# Patient Record
Sex: MALE | Race: WHITE | Employment: UNEMPLOYED | URBAN - METROPOLITAN AREA
[De-identification: names, ages, dates, MRNs, and addresses within clinical notes are randomized per-mention and may not be internally consistent; named-entity substitution may affect disease eponyms.]

---

## 2018-11-14 ENCOUNTER — HOSPITAL ENCOUNTER (OUTPATIENT)
Dept: LAB | Age: 58
Discharge: HOME | End: 2018-11-14

## 2018-11-14 PROCEDURE — 88305 TISSUE EXAM BY PATHOLOGIST: CPT

## 2020-10-13 NOTE — PROGRESS NOTES
Called to confirm appointment and arrival time for his procedure. Patient lives in a group home setting and will have a staff member present the entire time.

## 2020-10-14 ENCOUNTER — ANESTHESIA EVENT (OUTPATIENT)
Dept: ENDOSCOPY | Age: 60
End: 2020-10-14
Payer: MEDICARE

## 2020-10-14 ENCOUNTER — HOSPITAL ENCOUNTER (OUTPATIENT)
Age: 60
Setting detail: OUTPATIENT SURGERY
Discharge: HOME | End: 2020-10-14
Attending: INTERNAL MEDICINE | Admitting: INTERNAL MEDICINE
Payer: MEDICARE

## 2020-10-14 ENCOUNTER — ANESTHESIA (OUTPATIENT)
Dept: ENDOSCOPY | Age: 60
End: 2020-10-14
Payer: MEDICARE

## 2020-10-14 VITALS
TEMPERATURE: 97.6 F | BODY MASS INDEX: 34.44 KG/M2 | WEIGHT: 240 LBS | RESPIRATION RATE: 16 BRPM | SYSTOLIC BLOOD PRESSURE: 140 MMHG | DIASTOLIC BLOOD PRESSURE: 65 MMHG | OXYGEN SATURATION: 98 % | HEART RATE: 54 BPM

## 2020-10-14 LAB — GLUCOSE BLD STRIP.AUTO-MCNC: 149 MG/DL (ref 65–100)

## 2020-10-14 PROCEDURE — 74011000250 HC RX REV CODE- 250: Performed by: NURSE ANESTHETIST, CERTIFIED REGISTERED

## 2020-10-14 PROCEDURE — 76060000031 HC ANESTHESIA FIRST 0.5 HR: Performed by: INTERNAL MEDICINE

## 2020-10-14 PROCEDURE — 74011250636 HC RX REV CODE- 250/636: Performed by: INTERNAL MEDICINE

## 2020-10-14 PROCEDURE — 74011250636 HC RX REV CODE- 250/636: Performed by: NURSE ANESTHETIST, CERTIFIED REGISTERED

## 2020-10-14 PROCEDURE — 82962 GLUCOSE BLOOD TEST: CPT

## 2020-10-14 PROCEDURE — 76040000025: Performed by: INTERNAL MEDICINE

## 2020-10-14 PROCEDURE — 2709999900 HC NON-CHARGEABLE SUPPLY: Performed by: INTERNAL MEDICINE

## 2020-10-14 RX ORDER — PROPOFOL 10 MG/ML
INJECTION, EMULSION INTRAVENOUS
Status: DISCONTINUED | OUTPATIENT
Start: 2020-10-14 | End: 2020-10-14 | Stop reason: HOSPADM

## 2020-10-14 RX ORDER — LACTULOSE 10 G/15ML
10 SOLUTION ORAL; RECTAL 2 TIMES DAILY
COMMUNITY

## 2020-10-14 RX ORDER — SODIUM CHLORIDE, SODIUM LACTATE, POTASSIUM CHLORIDE, CALCIUM CHLORIDE 600; 310; 30; 20 MG/100ML; MG/100ML; MG/100ML; MG/100ML
1000 INJECTION, SOLUTION INTRAVENOUS CONTINUOUS
Status: DISCONTINUED | OUTPATIENT
Start: 2020-10-14 | End: 2020-10-14 | Stop reason: HOSPADM

## 2020-10-14 RX ORDER — PENICILLIN V POTASSIUM 500 MG/1
500 TABLET, FILM COATED ORAL 4 TIMES DAILY
COMMUNITY

## 2020-10-14 RX ORDER — INSULIN GLARGINE 100 [IU]/ML
33 INJECTION, SOLUTION SUBCUTANEOUS 2 TIMES DAILY
COMMUNITY

## 2020-10-14 RX ORDER — FUROSEMIDE 20 MG/1
20 TABLET ORAL DAILY
COMMUNITY

## 2020-10-14 RX ORDER — PROPOFOL 10 MG/ML
INJECTION, EMULSION INTRAVENOUS AS NEEDED
Status: DISCONTINUED | OUTPATIENT
Start: 2020-10-14 | End: 2020-10-14 | Stop reason: HOSPADM

## 2020-10-14 RX ORDER — INSULIN ASPART 100 [IU]/ML
16 INJECTION, SOLUTION INTRAVENOUS; SUBCUTANEOUS AS NEEDED
COMMUNITY

## 2020-10-14 RX ORDER — PRAVASTATIN SODIUM 20 MG/1
20 TABLET ORAL
COMMUNITY

## 2020-10-14 RX ORDER — LIDOCAINE HYDROCHLORIDE 20 MG/ML
INJECTION, SOLUTION EPIDURAL; INFILTRATION; INTRACAUDAL; PERINEURAL AS NEEDED
Status: DISCONTINUED | OUTPATIENT
Start: 2020-10-14 | End: 2020-10-14 | Stop reason: HOSPADM

## 2020-10-14 RX ORDER — NADOLOL 20 MG/1
20 TABLET ORAL DAILY
COMMUNITY

## 2020-10-14 RX ADMIN — LIDOCAINE HYDROCHLORIDE 25 MG: 20 INJECTION, SOLUTION EPIDURAL; INFILTRATION; INTRACAUDAL; PERINEURAL at 10:41

## 2020-10-14 RX ADMIN — PROPOFOL 200 MCG/KG/MIN: 10 INJECTION, EMULSION INTRAVENOUS at 10:41

## 2020-10-14 RX ADMIN — PROPOFOL 30 MG: 10 INJECTION, EMULSION INTRAVENOUS at 10:41

## 2020-10-14 RX ADMIN — SODIUM CHLORIDE, SODIUM LACTATE, POTASSIUM CHLORIDE, AND CALCIUM CHLORIDE: 600; 310; 30; 20 INJECTION, SOLUTION INTRAVENOUS at 10:38

## 2020-10-14 NOTE — ROUTINE PROCESS
VSS. Discharge instructions reviewed with patient and [place], caregiver and copy of instructions sent home with patient. Dr. Brendon Goldberg spoke with patient and caregiver prior to discharge. Questions answered. Discharged via wheelchair, wheeled out by [employer], [address] [place] [address]. IV discontinued prior to discharge. Personal items with patient at discharge: clothes, mask Dr. Brendon Goldberg gave caregiver instructions on morning medications

## 2020-10-14 NOTE — ANESTHESIA POSTPROCEDURE EVALUATION
Procedure(s):  ESOPHAGOGASTRODUODENOSCOPY (EGD)/ 26.    total IV anesthesia    Anesthesia Post Evaluation      Multimodal analgesia: multimodal analgesia used between 6 hours prior to anesthesia start to PACU discharge  Patient location during evaluation: PACU  Patient participation: complete - patient participated  Level of consciousness: awake and alert  Pain management: adequate  Airway patency: patent  Anesthetic complications: no  Cardiovascular status: acceptable  Respiratory status: acceptable  Hydration status: acceptable  Post anesthesia nausea and vomiting:  none  Final Post Anesthesia Temperature Assessment:  Normothermia (36.0-37.5 degrees C)      INITIAL Post-op Vital signs:   Vitals Value Taken Time   /73 10/14/2020 11:11 AM   Temp     Pulse 59 10/14/2020 11:13 AM   Resp 15 10/14/2020 10:52 AM   SpO2 100 % 10/14/2020 11:13 AM   Vitals shown include unvalidated device data.

## 2020-10-14 NOTE — ANESTHESIA PREPROCEDURE EVALUATION
Anesthetic History   No history of anesthetic complications            Review of Systems / Medical History  Patient summary reviewed and pertinent labs reviewed    Pulmonary            Asthma        Neuro/Psych              Cardiovascular    Hypertension: well controlled              Exercise tolerance: >4 METS  Comments: S/P AVR in 2014   GI/Hepatic/Renal     GERD: well controlled           Endo/Other    Diabetes: type 2, using insulin  Hypothyroidism: well controlled       Other Findings   Comments: Mental retardation         Physical Exam    Airway  Mallampati: II  TM Distance: > 6 cm    Mouth opening: Normal     Cardiovascular    Rhythm: regular           Dental  No notable dental hx       Pulmonary                 Abdominal  GI exam deferred       Other Findings            Anesthetic Plan    ASA: 3  Anesthesia type: total IV anesthesia          Induction: Intravenous  Anesthetic plan and risks discussed with: Patient and Legal guardian

## 2020-10-14 NOTE — OP NOTES
Esophagogastroduodenoscopy    DATE of PROCEDURE: 10/14/2020    INDICATION: Screen for varices    POSTPROCEDURE DIAGNOSIS:Portal Hypertensive Gastropathy  MEDICATIONS ADMINISTERED: MAC anesthesia (see anesthesia report)    INSTRUMENT: [id number]    PROCEDURE:  After obtaining informed consent, the patient was placed in the left lateral position and sedated. The endoscope was advanced under direct vision without difficulty. The esophagus, stomach (including retroflexed views) and duodenum were evaluated. The patient was taken to the recovery area in stable condition.     FINDINGS:  ESOPHAGUS: Normal, no Varices  STOMACH: Diffuse Portal Hypertensive Gastropathy  DUODENUM: Normal    Estimated blood loss: 0-minimal   Specimens obtained during procedure: none    PLAN: No medical changes, Repeat EGD 2 years

## 2020-10-14 NOTE — DISCHARGE INSTRUCTIONS
Gastrointestinal Esophagogastroduodenoscopy (EGD)/ Endoscopic Ultrasound(EUS)- Upper Exam Discharge Instructions    1. Call Dr. Aurora Kenney  for any problems or questions. 2. Contact the doctor's office for follow up appointment as directed. 3. Medication may cause drowsiness for several hours, therefore, do not drive or operate machinery for remainder of the day. 4. No alcohol today. 5. Do not make any important decisions such as signing legal paperwork. 6. Ordinarily, you may resume regular diet and activity after exam unless otherwise specified by your physician. 7. For mild soreness in your throat you may use Cepacol throat lozenges or warm  salt-water gargles as needed. Any additional instructions:   Repeat EGD in 2 years         Instructions given to [employer] and other family members.

## 2020-10-14 NOTE — H&P
[facility] H&P (Admission)        Date:  10/14/2020    Primary GI Physician:  tiffanie    Chief Complaint:  cirrhosis    Subjective:     History of Present Illness:  Patient is a [address] y.o. 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 PMH:  Past Medical History:   Diagnosis Date    CAD (coronary artery disease)     DM (diabetes mellitus) ([address]     managed with insulin    GERD (gastroesophageal reflux disease)     managed with meds    Poor historian        PSH:  Past Surgical History:   Procedure Laterality Date    HX AORTIC VALVE REPLACEMENT  10/20/2014    Dr. Deyanira Leiva    HX APPENDECTOMY      HX CHOLECYSTECTOMY      HX CORONARY ARTERY BYPASS GRAFT      HX ORTHOPAEDIC      left 2nd, 3rd, 4th partial amputation distal tips (injury)       Allergies: Allergies   Allergen Reactions    Codeine Swelling       Home Medications:  Prior to Admission medications    Medication Sig Start Date End Date Taking? Authorizing Provider   insulin glargine (LANTUS,BASAGLAR) 100 unit/mL (3 mL) inpn 33 Units by SubCUTAneous route two (2) times a day. Yes Provider, Historical   furosemide (LASIX) 20 mg tablet Take 20 mg by mouth daily. Yes Provider, Historical   lactulose (CHRONULAC) 10 gram/15 mL solution Take 10 g by mouth two (2) times a day. Yes Provider, Historical   nadoloL (CORGARD) 20 mg tablet Take 20 mg by mouth daily. Yes Provider, Historical   insulin aspart U-100 (NovoLOG Flexpen U-100 Insulin) 100 unit/mL (3 mL) inpn 16 Units by SubCUTAneous route as needed. Yes Provider, Historical   penicillin v potassium (VEETID) 500 mg tablet Take 500 mg by mouth four (4) times daily. Yes Provider, Historical   pravastatin (PravachoL) 20 mg tablet Take 20 mg by mouth nightly. Yes Provider, Historical   pantoprazole (Protonix) 40 mg granules for oral suspension 40 mg daily.    Yes Provider, Historical   ipratropium-albuterol (COMBIVENT) 18103 mcg/actuation inhaler Take 1 puff by inhalation four (4) times daily. Yes Provider, Historical   baclofen (LIORESAL) 10 mg tablet Take 10 mg by mouth three (3) times daily. For bladder   Yes Provider, Historical   levothyroxine (SYNTHROID) 25 mcg tablet Take 25 mcg by mouth Daily (before breakfast). Yes Provider, Historical   bimatoprost (LUMIGAN) 0.01 % ophthalmic drops Administer 1 drop to both eyes every evening. Yes Provider, Historical   levalbuterol (XOPENEX) 0.63 mg/3 mL nebulizer solution 3 mL by Nebulization route every six (6) hours as needed for Wheezing or Shortness of Breath. 10/27/14   Smiley Sandhu, NP   insulin lispro (HUMALOG) 100 unit/mL injection by SubCUTAneous route. 16 units with meals. Hold if <150 glucose    Provider, Historical   insulin detemir (LEVEMIR) 100 unit/mL injection by SubCUTAneous route nightly. Inject 33 units every morning  Indications: type 2 diabetes mellitus    Provider, Historical   metformin (GLUCOPHAGE) 500 mg tablet Take 500 mg by mouth two (2) times daily (with meals). Indications: TYPE 2 DIABETES MELLITUS    Provider, Historical   omega-3 acid ethyl esters (LOVAZA) 1 gram capsule Take 2 g by mouth two (2) times a day. Provider, Historical   olopatadine (PATANOL) 0.1 % ophthalmic solution Administer 1 drop to both eyes two (2) times a day. Provider, Historical   simvastatin (ZOCOR) 40 mg tablet Take 40 mg by mouth nightly. Provider, Historical   OTHER,NON-FORMULARY, Apply  to affected area. [employer] Antifungal 1% cream Apply on feet once daily at bedtime    Provider, Historical   acetaminophen (TYLENOL) 325 mg tablet Take 650 mg by mouth every four (4) hours as needed. For pain, fever, headache. Max. 8 tabs/day    Provider, Historical   OTHER,NON-FORMULARY, Emetrol one tablespoonful every 15 minutes for up to 4 doses. Provider, Historical   docusate sodium (DOC-Q-LACE) 100 mg capsule Take 100 mg by mouth daily as needed for Constipation. Provider, Historical   magnesium hydroxide (ALVARADO MILK OF MAGNESIA) 400 mg/5 mL suspension Take 30 mL by mouth daily as needed for Constipation. Provider, Historical   OTHER,NON-FORMULARY, Muscle rub cream apply 2 to 3 times daily as needed    Provider, Historical   Polyethylene Glycol 3350 powd Dissolve one capful in 8 oz. Liquid once daily for constipation as needed    Provider, Historical   OTHER,NON-FORMULARY, Safetussin DM cough  2 teaspoonsful by mouth every 4 hours as needed for cough    Provider, Historical   OTHER,NON-FORMULARY, Triple antibiotic ointment apply 2 times daily as needed for cuts and scratches    Provider, Historical       Hospital Medications:  Current Facility-Administered Medications   Medication Dose Route Frequency    lactated Ringers infusion 1,000 mL  1,000 mL IntraVENous CONTINUOUS       Social History:  Social History     Tobacco Use    Smoking status: Never Smoker    Smokeless tobacco: Current User    Tobacco comment: one can dip a day   Substance Use Topics    Alcohol use: No       Pt denies any history of drug use, tattoos, or blood transfusions. Family History:  Family History   Problem Relation Age of Onset    Diabetes Father        Review of Systems:  A detailed 10 system ROS is obtained, with pertinent positives as listed above. All others are negative. Objective:     Physical Exam:  Vitals:  Visit Vitals  /64   Pulse (!) 53   Temp 97.6 °F (36.4 °C)   Resp 18   Wt 108.9 kg (240 lb)   SpO2 99%   BMI 34.44 kg/m²     Gen:  Pt is alert, cooperative, no acute distress  Skin:  Extremities and face reveal no rashes. HEENT: Sclerae anicteric. Extra-occular muscles are intact. No oral ulcers. No abnormal pigmentation of the lips. The neck is supple. Cardiovascular: Regular rate and rhythm. No murmurs, gallops, or rubs. Respiratory:  Comfortable breathing with no accessory muscle use. Clear breath sounds with no wheezes, rales, or rhonchi.   GI:  Abdomen nondistended, soft, and nontender. Normal active bowel sounds. No enlargement of the liver or spleen. No masses palpable. Rectal:  Deferred  Musculoskeletal:  No pitting edema of the lower legs. Neurological:  Gross memory appears intact. Patient is alert and oriented. Psychiatric:  Mood appears appropriate with judgement intact. Lymphatic:  No cervical or supraclavicular adenopathy. Laboratory:    No results for input(s): WBC, HGB, HCT, PLT, MCV, NA, K, CL, CO2, BUN, CREA, CA, MG, GLU, AP, ALT, TBIL, TBILI, CBIL, ALB, TP, AML, LPSE, PTP, INR, APTT, HGBEXT, HCTEXT, PLTEXT, INREXT in the last 72 hours. No lab exists for component: SGOT, GPT, DBIL    Assessment:       Active Problems:    * No active hospital problems.  *      Plan:       Screen for varices

## 2023-02-02 RX ORDER — HYDROXYZINE HYDROCHLORIDE 25 MG/1
25 TABLET, FILM COATED ORAL 3 TIMES DAILY PRN
COMMUNITY

## 2023-02-02 RX ORDER — CHLORHEXIDINE GLUCONATE 0.12 MG/ML
15 RINSE ORAL 2 TIMES DAILY
COMMUNITY

## 2023-02-02 RX ORDER — ERGOCALCIFEROL 1.25 MG/1
50000 CAPSULE ORAL WEEKLY
COMMUNITY

## 2023-02-02 RX ORDER — BRIMONIDINE TARTRATE 2 MG/ML
1 SOLUTION/ DROPS OPHTHALMIC 2 TIMES DAILY
COMMUNITY

## 2023-02-02 RX ORDER — FUROSEMIDE 40 MG/1
40 TABLET ORAL EVERY MORNING
COMMUNITY

## 2023-02-02 RX ORDER — LATANOPROST 50 UG/ML
1 SOLUTION/ DROPS OPHTHALMIC NIGHTLY
COMMUNITY

## 2023-02-02 RX ORDER — FOLIC ACID 1 MG/1
1 TABLET ORAL DAILY
COMMUNITY

## 2023-02-02 RX ORDER — ATORVASTATIN CALCIUM 40 MG/1
40 TABLET, FILM COATED ORAL
COMMUNITY

## 2023-02-02 RX ORDER — SPIRONOLACTONE 25 MG/1
25 TABLET ORAL 2 TIMES DAILY
COMMUNITY

## 2023-02-02 NOTE — PERIOP NOTE
Pre-assessment completed with documents from [facility]. The following instructions were faxed to [id number] @ [facility] per request at [phone]. Please be aware that the procedure has been scheduled at the [facility] for [facility]. The address is [address] [place] [address]. If arrival time has not been received, call [phone]. Follow diet and prep instructions provided by the office, specifically those instructions regarding bowel prep, and/or the holding of food, drink, as well as gum, candy and mints prior to the procedure. If patient has NOT received any specific instructions from scheduling surgeon's office, you are advised to call surgeon's office to obtain instructions as soon as possible at [phone]     Bathe or shower the night before and the morning of procedure with non-moisturizing or antibacterial soap. NO lotions, oils, powders, or cologne on skin. NO make-up including eye make-up, and NO jewelry. Wear loose fitting comfortable, clean clothing. PATIENT TO TAKE ONLY THE FOLLOWING MEDICATION ON THE DAY OF THE PROCEDURE WITH SIPS OF WATER PER ANESTHESIOLOGIST: pantoprazole,nadolol, levothryoxine, Combivent inhaler, Basaglar 26 units. Also decrease Basaglar the evening prior to surgery to 26 units. / order per protocol Dr Babs Christie / Marcus Choe RN 2/2/23 @ 1400. MEDICATIONS TO HOLD:  vitamins and supplements 7 days prior, NSAIDS ( naproxen, ibuprofen) 5 days prior     Discharge instructions : medication given during procedure may cause drowsiness for several hours, therefore, patient must not drive or operate machinery for remainder of the day. Patient may not drink alcohol on the day of your procedure, and should resume regular diet and activity unless otherwise directed. Patient may experience abdominal distention for several hours that is relieved by the passage of gas.  Contact Dr Librado Gonzalez office if you have any of the following: fever or chills, severe abdominal pain or excessive amount of bleeding or a large amount when having a bowel movement.  Occasional specks of blood with bowel movement would not be unusual.

## 2023-02-02 NOTE — PROGRESS NOTES
Records received from [facility]. - [facility], scanned into Media if needed for reference.

## 2023-02-06 RX ORDER — HYDROMORPHONE HYDROCHLORIDE 1 MG/ML
0.5 INJECTION, SOLUTION INTRAMUSCULAR; INTRAVENOUS; SUBCUTANEOUS EVERY 5 MIN PRN
Status: CANCELLED | OUTPATIENT
Start: 2023-02-06

## 2023-02-06 RX ORDER — HYDROMORPHONE HYDROCHLORIDE 1 MG/ML
0.25 INJECTION, SOLUTION INTRAMUSCULAR; INTRAVENOUS; SUBCUTANEOUS EVERY 5 MIN PRN
Status: CANCELLED | OUTPATIENT
Start: 2023-02-06

## 2023-02-06 RX ORDER — DEXTROSE MONOHYDRATE 100 MG/ML
INJECTION, SOLUTION INTRAVENOUS CONTINUOUS PRN
Status: CANCELLED | OUTPATIENT
Start: 2023-02-06

## 2023-02-06 RX ORDER — LABETALOL HYDROCHLORIDE 5 MG/ML
10 INJECTION, SOLUTION INTRAVENOUS
Status: CANCELLED | OUTPATIENT
Start: 2023-02-06

## 2023-02-06 RX ORDER — ONDANSETRON 2 MG/ML
4 INJECTION INTRAMUSCULAR; INTRAVENOUS
Status: CANCELLED | OUTPATIENT
Start: 2023-02-06 | End: 2023-02-07

## 2023-02-06 RX ORDER — OXYCODONE HYDROCHLORIDE 5 MG/1
10 TABLET ORAL PRN
Status: CANCELLED | OUTPATIENT
Start: 2023-02-06 | End: 2023-02-06

## 2023-02-06 RX ORDER — IPRATROPIUM BROMIDE AND ALBUTEROL SULFATE 2.5; .5 MG/3ML; MG/3ML
1 SOLUTION RESPIRATORY (INHALATION)
Status: CANCELLED | OUTPATIENT
Start: 2023-02-06 | End: 2023-02-07

## 2023-02-06 RX ORDER — OXYCODONE HYDROCHLORIDE 5 MG/1
5 TABLET ORAL PRN
Status: CANCELLED | OUTPATIENT
Start: 2023-02-06 | End: 2023-02-06

## 2023-02-06 NOTE — PROGRESS NOTES
Spoke with patients caregiver Rayna Abimbola. Informed her of an arrival time of 0800 for his 0930 procedure.  policy given and she was informed to have him register downstairs prior to coming to the GI lab. She verbalized understanding of all instructions.

## 2023-02-07 ENCOUNTER — ANESTHESIA (OUTPATIENT)
Dept: ENDOSCOPY | Age: 63
End: 2023-02-07
Payer: MEDICARE

## 2023-02-07 ENCOUNTER — HOSPITAL ENCOUNTER (OUTPATIENT)
Age: 63
Setting detail: OUTPATIENT SURGERY
Discharge: HOME | End: 2023-02-07
Attending: INTERNAL MEDICINE | Admitting: INTERNAL MEDICINE
Payer: MEDICARE

## 2023-02-07 ENCOUNTER — ANESTHESIA EVENT (OUTPATIENT)
Dept: ENDOSCOPY | Age: 63
End: 2023-02-07
Payer: MEDICARE

## 2023-02-07 VITALS
DIASTOLIC BLOOD PRESSURE: 57 MMHG | BODY MASS INDEX: 29.02 KG/M2 | TEMPERATURE: 97.7 F | HEIGHT: 70 IN | OXYGEN SATURATION: 95 % | HEART RATE: 62 BPM | SYSTOLIC BLOOD PRESSURE: 117 MMHG | RESPIRATION RATE: 18 BRPM | WEIGHT: 202.7 LBS

## 2023-02-07 PROBLEM — I85.00 ESOPHAGEAL VARICES WITHOUT BLEEDING (HCC): Status: ACTIVE | Noted: 2018-11-28

## 2023-02-07 PROBLEM — K74.60 LIVER CIRRHOSIS SECONDARY TO NASH (NONALCOHOLIC STEATOHEPATITIS) (HCC): Status: ACTIVE | Noted: 2019-11-20

## 2023-02-07 PROBLEM — K76.6 PORTAL HYPERTENSION (HCC): Status: ACTIVE | Noted: 2020-01-14

## 2023-02-07 PROBLEM — K75.81 LIVER CIRRHOSIS SECONDARY TO NASH (NONALCOHOLIC STEATOHEPATITIS) (HCC): Status: ACTIVE | Noted: 2019-11-20

## 2023-02-07 LAB
GLUCOSE BLD STRIP.AUTO-MCNC: 68 MG/DL (ref 65–100)
GLUCOSE BLD STRIP.AUTO-MCNC: 91 MG/DL (ref 65–100)
SERVICE CMNT-IMP: NORMAL
SERVICE CMNT-IMP: NORMAL

## 2023-02-07 PROCEDURE — C1889 IMPLANT/INSERT DEVICE, NOC: HCPCS | Performed by: INTERNAL MEDICINE

## 2023-02-07 PROCEDURE — 3700000000 HC ANESTHESIA ATTENDED CARE: Performed by: INTERNAL MEDICINE

## 2023-02-07 PROCEDURE — 2580000003 HC RX 258: Performed by: ANESTHESIOLOGY

## 2023-02-07 PROCEDURE — 3700000001 HC ADD 15 MINUTES (ANESTHESIA): Performed by: INTERNAL MEDICINE

## 2023-02-07 PROCEDURE — 7100000010 HC PHASE II RECOVERY - FIRST 15 MIN: Performed by: INTERNAL MEDICINE

## 2023-02-07 PROCEDURE — 2709999900 HC NON-CHARGEABLE SUPPLY: Performed by: INTERNAL MEDICINE

## 2023-02-07 PROCEDURE — 6360000002 HC RX W HCPCS: Performed by: NURSE ANESTHETIST, CERTIFIED REGISTERED

## 2023-02-07 PROCEDURE — 88305 TISSUE EXAM BY PATHOLOGIST: CPT

## 2023-02-07 PROCEDURE — 7100000011 HC PHASE II RECOVERY - ADDTL 15 MIN: Performed by: INTERNAL MEDICINE

## 2023-02-07 PROCEDURE — 2500000003 HC RX 250 WO HCPCS: Performed by: NURSE ANESTHETIST, CERTIFIED REGISTERED

## 2023-02-07 PROCEDURE — 3609010700 HC COLONOSCOPY POLYPECTOMY REMOVAL SNARE/STOMA: Performed by: INTERNAL MEDICINE

## 2023-02-07 PROCEDURE — 82962 GLUCOSE BLOOD TEST: CPT

## 2023-02-07 PROCEDURE — 3609017100 HC EGD: Performed by: INTERNAL MEDICINE

## 2023-02-07 RX ORDER — SODIUM CHLORIDE 0.9 % (FLUSH) 0.9 %
5-40 SYRINGE (ML) INJECTION EVERY 12 HOURS SCHEDULED
Status: DISCONTINUED | OUTPATIENT
Start: 2023-02-07 | End: 2023-02-07 | Stop reason: HOSPADM

## 2023-02-07 RX ORDER — EPHEDRINE SULFATE/0.9% NACL/PF 50 MG/5 ML
SYRINGE (ML) INTRAVENOUS PRN
Status: DISCONTINUED | OUTPATIENT
Start: 2023-02-07 | End: 2023-02-07 | Stop reason: SDUPTHER

## 2023-02-07 RX ORDER — SODIUM CHLORIDE 0.9 % (FLUSH) 0.9 %
5-40 SYRINGE (ML) INJECTION PRN
Status: DISCONTINUED | OUTPATIENT
Start: 2023-02-07 | End: 2023-02-07 | Stop reason: HOSPADM

## 2023-02-07 RX ORDER — PROPOFOL 10 MG/ML
INJECTION, EMULSION INTRAVENOUS CONTINUOUS PRN
Status: DISCONTINUED | OUTPATIENT
Start: 2023-02-07 | End: 2023-02-07 | Stop reason: SDUPTHER

## 2023-02-07 RX ORDER — PHENYLEPHRINE HYDROCHLORIDE 10 MG/ML
INJECTION INTRAVENOUS PRN
Status: DISCONTINUED | OUTPATIENT
Start: 2023-02-07 | End: 2023-02-07 | Stop reason: SDUPTHER

## 2023-02-07 RX ORDER — LIDOCAINE HYDROCHLORIDE 20 MG/ML
INJECTION, SOLUTION EPIDURAL; INFILTRATION; INTRACAUDAL; PERINEURAL PRN
Status: DISCONTINUED | OUTPATIENT
Start: 2023-02-07 | End: 2023-02-07 | Stop reason: SDUPTHER

## 2023-02-07 RX ORDER — MIDAZOLAM HYDROCHLORIDE 2 MG/2ML
2 INJECTION, SOLUTION INTRAMUSCULAR; INTRAVENOUS
Status: DISCONTINUED | OUTPATIENT
Start: 2023-02-07 | End: 2023-02-07 | Stop reason: HOSPADM

## 2023-02-07 RX ORDER — SODIUM CHLORIDE 9 MG/ML
INJECTION, SOLUTION INTRAVENOUS PRN
Status: DISCONTINUED | OUTPATIENT
Start: 2023-02-07 | End: 2023-02-07 | Stop reason: HOSPADM

## 2023-02-07 RX ORDER — PROPOFOL 10 MG/ML
INJECTION, EMULSION INTRAVENOUS PRN
Status: DISCONTINUED | OUTPATIENT
Start: 2023-02-07 | End: 2023-02-07 | Stop reason: SDUPTHER

## 2023-02-07 RX ORDER — LIDOCAINE HYDROCHLORIDE 10 MG/ML
1 INJECTION, SOLUTION INFILTRATION; PERINEURAL
Status: DISCONTINUED | OUTPATIENT
Start: 2023-02-07 | End: 2023-02-07 | Stop reason: HOSPADM

## 2023-02-07 RX ORDER — SODIUM CHLORIDE, SODIUM LACTATE, POTASSIUM CHLORIDE, CALCIUM CHLORIDE 600; 310; 30; 20 MG/100ML; MG/100ML; MG/100ML; MG/100ML
INJECTION, SOLUTION INTRAVENOUS CONTINUOUS
Status: DISCONTINUED | OUTPATIENT
Start: 2023-02-07 | End: 2023-02-07 | Stop reason: HOSPADM

## 2023-02-07 RX ORDER — TRISODIUM CITRATE DIHYDRATE AND CITRIC ACID MONOHYDRATE 500; 334 MG/5ML; MG/5ML
30 SOLUTION ORAL
Status: DISCONTINUED | OUTPATIENT
Start: 2023-02-07 | End: 2023-02-07 | Stop reason: HOSPADM

## 2023-02-07 RX ORDER — ALBUTEROL SULFATE 2.5 MG/3ML
2.5 SOLUTION RESPIRATORY (INHALATION)
Status: DISCONTINUED | OUTPATIENT
Start: 2023-02-07 | End: 2023-02-07 | Stop reason: HOSPADM

## 2023-02-07 RX ADMIN — Medication 5 MG: at 10:02

## 2023-02-07 RX ADMIN — Medication 10 MG: at 09:51

## 2023-02-07 RX ADMIN — PROPOFOL 180 MCG/KG/MIN: 10 INJECTION, EMULSION INTRAVENOUS at 09:43

## 2023-02-07 RX ADMIN — SODIUM CHLORIDE, POTASSIUM CHLORIDE, SODIUM LACTATE AND CALCIUM CHLORIDE: 600; 310; 30; 20 INJECTION, SOLUTION INTRAVENOUS at 08:50

## 2023-02-07 RX ADMIN — PHENYLEPHRINE HYDROCHLORIDE 50 MCG: 10 INJECTION INTRAVENOUS at 10:00

## 2023-02-07 RX ADMIN — PHENYLEPHRINE HYDROCHLORIDE 50 MCG: 10 INJECTION INTRAVENOUS at 10:02

## 2023-02-07 RX ADMIN — LIDOCAINE HYDROCHLORIDE 40 MG: 20 INJECTION, SOLUTION EPIDURAL; INFILTRATION; INTRACAUDAL; PERINEURAL at 09:43

## 2023-02-07 RX ADMIN — PHENYLEPHRINE HYDROCHLORIDE 100 MCG: 10 INJECTION INTRAVENOUS at 10:08

## 2023-02-07 RX ADMIN — Medication 5 MG: at 10:00

## 2023-02-07 RX ADMIN — Medication 5 MG: at 10:08

## 2023-02-07 RX ADMIN — PROPOFOL 50 MG: 10 INJECTION, EMULSION INTRAVENOUS at 09:43

## 2023-02-07 ASSESSMENT — PAIN SCALES - GENERAL
PAINLEVEL_OUTOF10: 0

## 2023-02-07 ASSESSMENT — PAIN - FUNCTIONAL ASSESSMENT
PAIN_FUNCTIONAL_ASSESSMENT: ACTIVITIES ARE NOT PREVENTED
PAIN_FUNCTIONAL_ASSESSMENT: FACE, LEGS, ACTIVITY, CRY, AND CONSOLABILITY (FLACC)

## 2023-02-07 NOTE — ANESTHESIA PRE PROCEDURE
Department of Anesthesiology  Preprocedure Note       Name:  Ruby Grey   Age:  58 y.o.  :  1960                                          MRN:  [id number]         Date:  2023      Surgeon: Jazz Berger):  Leah Buitrago MD    Procedure: Procedure(s):  EGD ESOPHAGOGASTRODUODENOSCOPY  COLORECTAL CANCER SCREENING, NOT HIGH RISK/29    Medications prior to admission:   Prior to Admission medications    Medication Sig Start Date End Date Taking? Authorizing Provider   atorvastatin (LIPITOR) 40 MG tablet Take 40 mg by mouth nightly   Yes Historical Provider, MD   brimonidine (ALPHAGAN) 0.2 % ophthalmic solution Place 1 drop into both eyes in the morning and at bedtime   Yes Historical Provider, MD   chlorhexidine (PERIDEX) 0.12 % solution Swish and spit 15 mLs 2 times daily   Yes Historical Provider, MD   albuterol-ipratropium (COMBIVENT RESPIMAT)  MCG/ACT AERS inhaler Inhale 1 puff into the lungs in the morning and 1 puff at noon and 1 puff in the evening and 1 puff before bedtime.    Yes Historical Provider, MD   sodium chloride (OCEAN, BABY AYR) 0.65 % nasal spray 1 spray by Nasal route in the morning and at bedtime   Yes Historical Provider, MD   folic acid (FOLVITE) 1 MG tablet Take 1 mg by mouth daily   Yes Historical Provider, MD   furosemide (LASIX) 40 MG tablet Take 40 mg by mouth every morning   Yes Historical Provider, MD   spironolactone (ALDACTONE) 25 MG tablet Take 25 mg by mouth 2 times daily   Yes Historical Provider, MD   vitamin D (ERGOCALCIFEROL) 1.25 MG (41733 UT) CAPS capsule Take 50,000 Units by mouth once a week   Yes Historical Provider, MD   latanoprost (XALATAN) 0.005 % ophthalmic solution Place 1 drop into both eyes nightly   Yes Historical Provider, MD   hydrOXYzine HCl (ATARAX) 25 MG tablet Take 25 mg by mouth 3 times daily as needed for Itching   Yes Historical Provider, MD   Alum & Mag Hydroxide-Simeth (HEYDI-LANTA PO) Take 15 mLs by mouth every 8 hours as needed   Yes Historical Provider, MD   Ipratropium-Albuterol (COMBIVENT RESPIMAT IN) Inhale 1 puff into the lungs 4 times daily    Historical Provider, MD   Polyethylene Glycol 3350 POWD Bowel prep    Ar Automatic Reconciliation   acetaminophen (TYLENOL) 325 MG tablet Take 650 mg by mouth every 4 hours as needed    Ar Automatic Reconciliation   baclofen (LIORESAL) 10 MG tablet Take 10 mg by mouth 3 times daily    Ar Automatic Reconciliation   bimatoprost (LUMIGAN) 0.01 % SOLN ophthalmic drops Apply 1 drop to eye every evening    Ar Automatic Reconciliation   insulin aspart (NOVOLOG) 100 UNIT/ML injection pen Inject 14 Units into the skin 3 times daily (before meals) Hold for blood sugar<150    Ar Automatic Reconciliation   insulin glargine (LANTUS;BASAGLAR) 100 UNIT/ML injection pen Inject 32 Units into the skin 2 times daily    Ar Automatic Reconciliation   lactulose (CHRONULAC) 10 GM/15ML solution Take 10 g by mouth 2 times daily    Ar Automatic Reconciliation   levothyroxine (SYNTHROID) 25 MCG tablet Take 25 mcg by mouth every morning (before breakfast)    Ar Automatic Reconciliation   nadolol (CORGARD) 20 MG tablet Take 20 mg by mouth daily    Ar Automatic Reconciliation   olopatadine (PATANOL) 0.1 % ophthalmic solution Apply 1 drop to eye daily as needed    Ar Automatic Reconciliation   pantoprazole sodium (PROTONIX) 40 MG PACK packet Take 40 mg by mouth every morning (before breakfast)    Ar Automatic Reconciliation       Current medications:    Current Facility-Administered Medications   Medication Dose Route Frequency Provider Last Rate Last Admin    lidocaine 1 % injection 1 mL  1 mL IntraDERmal Once PRN Racquel Page MD        famotidine (PEPCID) 20 mg in sodium chloride (PF) 0.9 % 10 mL injection  20 mg IntraVENous Once PRN Racquel Page MD        citric acid-sodium citrate (BICITRA) solution 30 mL  30 mL Oral Once PRN Racquel Page MD        lactated ringers IV soln infusion   IntraVENous Continuous Lilia Murguia  mL/hr at 02/07/23 0850 New Bag at 02/07/23 0850    sodium chloride flush 0.9 % injection 5-40 mL  5-40 mL IntraVENous 2 times per day Lilia Murguia MD        sodium chloride flush 0.9 % injection 5-40 mL  5-40 mL IntraVENous PRN Lilia Murgiua MD        0.9 % sodium chloride infusion   IntraVENous PRN Lilia Murguia MD        midazolam PF (VERSED) injection 2 mg  2 mg IntraVENous Once PRN Lilia Murguia MD        albuterol (PROVENTIL) nebulizer solution 2.5 mg  2.5 mg Nebulization Q2H PRN Lilia Murguia MD           Allergies:     Allergies   Allergen Reactions    Codeine Swelling       Problem List:    Patient Active Problem List   Diagnosis Code    S/P AVR (aortic valve replacement) Z95.2    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 D69.59    Hypercholesterolemia E78.00    Hypertension I10    Ineffective airway clearance R06.89    Asthma J45.909    Diabetes ([facility] E11.9    Aortic stenosis I35.0    Hypothyroidism E03.9    GERD (gastroesophageal reflux disease) K21.9    Environmental allergies Z91.09    History of asthma Z87.09    Hypoxemia R09.02    Postoperative anemia due to acute blood loss D62       Past Medical History:        Diagnosis Date    Aortic valve replaced 2014    bioprosthetic (21mm MAGNA EASE AORTIC VALVE)    CAD (coronary artery disease)     Chronic airway obstruction (HCC)     Cirrhosis (HCC)     DM (diabetes mellitus) ([facility]     managed with insulin    Dyssynergia     Endocarditis of mitral valve     GERD (gastroesophageal reflux disease)     managed with meds    Glaucoma     High cholesterol     History of BPH     History of kidney stones     HTN (hypertension)     Intellectual disability     Osteoarthritis     Poor historian     Smoker     Thrombocytopenia ([facility]     Thyroid disease     Urinary retention with incomplete bladder emptying        Past Surgical History:        Procedure Laterality Date    AORTIC VALVE REPLACEMENT  10/20/2014    AORTIC VALVE REPLACEMENT (AVR) / bioprosthetic (21mm MAGNA EASE AORTIC VALVE)    APPENDECTOMY      CHOLECYSTECTOMY      CORONARY ARTERY BYPASS GRAFT      CYSTOSCOPY  09/2007    ORTHOPEDIC SURGERY      left 2nd, 3rd, 4th partial amputation distal tips (injury)       Social History:    Social History     Tobacco Use    Smoking status: Never    Smokeless tobacco: Current    Tobacco comments:     Quit smoking: one can dip a day   Substance Use Topics    Alcohol use: No                                Ready to quit: Not Answered  Counseling given: Not Answered  Tobacco comments: Quit smoking: one can dip a day      Vital Signs (Current):   Vitals:    02/02/23 1322 02/07/23 0847   BP:  (!) 172/70   Pulse:  59   Resp:  16   Temp:  98.1 °F (36.7 °C)   TempSrc:  Oral   SpO2:  99%   Weight: 202 lb 11.2 oz (91.9 kg)    Height: 5' 10\" (1.778 m) 5' 10\" (1.778 m)                                              BP Readings from Last 3 Encounters:   02/07/23 (!) 172/70       NPO Status: Time of last liquid consumption: 0600                        Time of last solid consumption: 2100                        Date of last liquid consumption: 02/07/23                        Date of last solid food consumption: 02/05/23    BMI:   Wt Readings from Last 3 Encounters:   02/02/23 202 lb 11.2 oz (91.9 kg)     Body mass index is 29.08 kg/m².     CBC: No results found for: WBC, RBC, HGB, HCT, MCV, RDW, PLT    CMP: No results found for: NA, K, CL, CO2, BUN, CREATININE, GFRAA, AGRATIO, LABGLOM, GLUCOSE, GLU, PROT, CALCIUM, BILITOT, ALKPHOS, AST, ALT    POC Tests:   Recent Labs     02/07/23  0839   POCGLU 68       Coags: No results found for: PROTIME, INR, APTT    HCG (If Applicable): No results found for: PREGTESTUR, PREGSERUM, HCG, HCGQUANT     ABGs: No results found for: PHART, PO2ART, GJL4XGN, AVE8YGK, BEART, Q9RQHRTY     Type & Screen (If Applicable):  No results found for: LABABO, [address]    Drug/Infectious Status (If Applicable):  No results found for: HIV, HEPCAB    COVID-19 Screening (If Applicable): No results found for: COVID19        Anesthesia Evaluation  Patient summary reviewed and Nursing notes reviewed  Airway: Mallampati: I  TM distance: <3 FB   Neck ROM: full  Mouth opening: > = 3 FB   Dental:    (+) poor dentition      Pulmonary: breath sounds clear to auscultation  (+) COPD:  asthma:                            Cardiovascular:    (+) hypertension:, valvular problems/murmurs ( Aortic valve replaced bioprosthetic ):, CAD:, CABG/stent (CABG):, murmur: Grade 2, Aortic,         Rhythm: regular  Rate: normal                    Neuro/Psych:   (+) neuromuscular disease (Dyssynergia):,             GI/Hepatic/Renal:   (+) GERD:, liver disease (LUNA cirrhosis ): esophageal varices,           Endo/Other:    (+) Diabetes (BS 91)Type II DM, , hypothyroidism, blood dyscrasia: thrombocytopenia:., .                 Abdominal:             Vascular: Other Findings:           Anesthesia Plan      TIVA     ASA 4     (Patient is developmentally delayed. Lives at a group home. Caregiver consented for anesthesia    Dr. Freda De La eVga aware of thrombocytopenia (40k in Nov 2022). He plans to assess for varices and not biopsy today. If any lesion is noted, he plans to bring the patient back for intervention/biopsy later)  Induction: intravenous. Anesthetic plan and risks discussed with patient Jessica Balderrama  - caregiver). Plan discussed with CRNA.                     Annamarie Rizzo MD   2/7/2023

## 2023-02-07 NOTE — OP NOTE
COLONOSCOPY    DATE of PROCEDURE: 2/7/2023    INDICATION: Hx colon polyp    POSTPROCEDURE DIAGNOSIS: Colon Polyps    MEDICATION:   MAC anesthesia (see anesthesia report)    INSTRUMENT: [id number]    PROCEDURE: After obtaining informed consent, the patient was placed in the left lateral position and sedated. The endoscope was advanced to the cecum where the appendiceal orifice and ileocecal valve were identified. On withdrawal, the colon was carefully visualized in a 360 degree fashion, looking between folds and proximal and distal aspect of the folds. The patient was taken to the recovery area in stable condition.     FINDINGS:  Rectum: Rectal varices  Sigmoid: 1cm polyp snared base clipped  Descending Colon: normal  Transverse Colon: normal  Ascending Colon: (3) 4-7mm polyps removed by snare and cold Bx, base of largest clipped  Cecum: normal  Terminal ileum: not entered    Bowel Prep: Adequate  Estimated blood loss: 5cc  Specimens obtained during procedure: polyps    ASSESSMENT/PLAN: Follow path

## 2023-02-07 NOTE — H&P
[facility] H&P (Admission)        Date:  2/7/2023    Primary GI Physician:  Karli    Chief Complaint:  screening    Subjective:     History of Present Illness:  Patient is a 58 y.o. male with PMH ncluding but not limited to cirrhosis, developmental delay, who is being admitted for elective endoscopy. PMH:  Past Medical History:   Diagnosis Date    Aortic valve replaced 2014    bioprosthetic (21mm MAGNA EASE AORTIC VALVE)    CAD (coronary artery disease)     Chronic airway obstruction (HCC)     Cirrhosis (HCC)     DM (diabetes mellitus) ([address]     managed with insulin    Dyssynergia     Endocarditis of mitral valve     GERD (gastroesophageal reflux disease)     managed with meds    Glaucoma     High cholesterol     History of BPH     History of kidney stones     HTN (hypertension)     Intellectual disability     Osteoarthritis     Poor historian     Smoker     Thrombocytopenia (HCC)     Thyroid disease     Urinary retention with incomplete bladder emptying        PSH:  Past Surgical History:   Procedure Laterality Date    AORTIC VALVE REPLACEMENT  10/20/2014    AORTIC VALVE REPLACEMENT (AVR) / bioprosthetic (21mm MAGNA EASE AORTIC VALVE)    APPENDECTOMY      CHOLECYSTECTOMY      CORONARY ARTERY BYPASS GRAFT      CYSTOSCOPY  09/2007    ORTHOPEDIC SURGERY      left 2nd, 3rd, 4th partial amputation distal tips (injury)       Allergies: Allergies   Allergen Reactions    Codeine Swelling       Home Medications:  Prior to Admission medications    Medication Sig Start Date End Date Taking?  Authorizing Provider   atorvastatin (LIPITOR) 40 MG tablet Take 40 mg by mouth nightly   Yes Historical Provider, MD   brimonidine (ALPHAGAN) 0.2 % ophthalmic solution Place 1 drop into both eyes in the morning and at bedtime   Yes Historical Provider, MD   chlorhexidine (PERIDEX) 0.12 % solution Swish and spit 15 mLs 2 times daily   Yes Historical Provider, MD   albuterol-ipratropium (COMBIVENT RESPIMAT)  MCG/ACT AERS inhaler Inhale 1 puff into the lungs in the morning and 1 puff at noon and 1 puff in the evening and 1 puff before bedtime.    Yes Historical Provider, MD   sodium chloride (OCEAN, BABY AYR) 0.65 % nasal spray 1 spray by Nasal route in the morning and at bedtime   Yes Historical Provider, MD   folic acid (FOLVITE) 1 MG tablet Take 1 mg by mouth daily   Yes Historical Provider, MD   furosemide (LASIX) 40 MG tablet Take 40 mg by mouth every morning   Yes Historical Provider, MD   spironolactone (ALDACTONE) 25 MG tablet Take 25 mg by mouth 2 times daily   Yes Historical Provider, MD   vitamin D (ERGOCALCIFEROL) 1.25 MG (28158 UT) CAPS capsule Take 50,000 Units by mouth once a week   Yes Historical Provider, MD   latanoprost (XALATAN) 0.005 % ophthalmic solution Place 1 drop into both eyes nightly   Yes Historical Provider, MD   hydrOXYzine HCl (ATARAX) 25 MG tablet Take 25 mg by mouth 3 times daily as needed for Itching   Yes Historical Provider, MD   Alum & Mag Hydroxide-Simeth (HEYDI-LANTA PO) Take 15 mLs by mouth every 8 hours as needed   Yes Historical Provider, MD   Ipratropium-Albuterol (COMBIVENT RESPIMAT IN) Inhale 1 puff into the lungs 4 times daily    Historical Provider, MD   Polyethylene Glycol 3350 POWD Bowel prep    Ar Automatic Reconciliation   acetaminophen (TYLENOL) 325 MG tablet Take 650 mg by mouth every 4 hours as needed    Ar Automatic Reconciliation   baclofen (LIORESAL) 10 MG tablet Take 10 mg by mouth 3 times daily    Ar Automatic Reconciliation   bimatoprost (LUMIGAN) 0.01 % SOLN ophthalmic drops Apply 1 drop to eye every evening    Ar Automatic Reconciliation   insulin aspart (NOVOLOG) 100 UNIT/ML injection pen Inject 14 Units into the skin 3 times daily (before meals) Hold for blood sugar<150    Ar Automatic Reconciliation   insulin glargine (LANTUS;BASAGLAR) 100 UNIT/ML injection pen Inject 32 Units into the skin 2 times daily    Ar Automatic Reconciliation   lactulose (CHRONULAC) 10 GM/15ML solution Take 10 g by mouth 2 times daily    Ar Automatic Reconciliation   levothyroxine (SYNTHROID) 25 MCG tablet Take 25 mcg by mouth every morning (before breakfast)    Ar Automatic Reconciliation   nadolol (CORGARD) 20 MG tablet Take 20 mg by mouth daily    Ar Automatic Reconciliation   olopatadine (PATANOL) 0.1 % ophthalmic solution Apply 1 drop to eye daily as needed    Ar Automatic Reconciliation   pantoprazole sodium (PROTONIX) 40 MG PACK packet Take 40 mg by mouth every morning (before breakfast)    Ar Automatic Reconciliation       Hospital Medications:  Current Facility-Administered Medications   Medication Dose Route Frequency    lidocaine 1 % injection 1 mL  1 mL IntraDERmal Once PRN    famotidine (PEPCID) 20 mg in sodium chloride (PF) 0.9 % 10 mL injection  20 mg IntraVENous Once PRN    citric acid-sodium citrate (BICITRA) solution 30 mL  30 mL Oral Once PRN    lactated ringers IV soln infusion   IntraVENous Continuous    sodium chloride flush 0.9 % injection 5-40 mL  5-40 mL IntraVENous 2 times per day    sodium chloride flush 0.9 % injection 5-40 mL  5-40 mL IntraVENous PRN    0.9 % sodium chloride infusion   IntraVENous PRN    midazolam PF (VERSED) injection 2 mg  2 mg IntraVENous Once PRN    albuterol (PROVENTIL) nebulizer solution 2.5 mg  2.5 mg Nebulization Q2H PRN       Social History:  Social History     Tobacco Use    Smoking status: Never    Smokeless tobacco: Current    Tobacco comments:     Quit smoking: one can dip a day   Substance Use Topics    Alcohol use: No       Pt denies any history of drug use, tattoos, or blood transfusions. Family History:  Family History   Problem Relation Age of Onset    Diabetes Father        Review of Systems:  A detailed 10 system ROS is obtained, with pertinent positives as listed above. All others are negative.     Objective:     Physical Exam:  Vitals:  BP (!) 172/70   Pulse 59   Temp 98.1 °F (36.7 °C) (Oral)   Resp 16   Ht 5' 10\" (1.778 m)   Wt 202 lb 11.2 oz (91.9 kg)   SpO2 99%   BMI 29.08 kg/m²   Gen:  Pt is alert, cooperative, no acute distress  Skin:  Extremities and face reveal no rashes. HEENT: Sclerae anicteric. Extra-occular muscles are intact. No oral ulcers. No abnormal pigmentation of the lips. The neck is supple. Cardiovascular: Regular rate and rhythm. No murmurs, gallops, or rubs. Respiratory:  Comfortable breathing with no accessory muscle use. Clear breath sounds with no wheezes, rales, or rhonchi. GI:  Abdomen nondistended, soft, and nontender. Normal active bowel sounds. No enlargement of the liver or spleen. No masses palpable. Rectal:  Deferred  Musculoskeletal:  No pitting edema of the lower legs. Neurological:  Gross memory appears intact. Patient is alert and oriented. Psychiatric:  Mood appears appropriate with judgement intact. Lymphatic:  No cervical or supraclavicular adenopathy. Laboratory:    No results for input(s): WBC, HGB, HCT, PLT, MCV, NA, K, CL, CO2, BUN, CREA, CA, MG, GLU, AST, ALT, ALB, TP, AML, INR, APTT in the last 72 hours. Invalid input(s): AP, GPT, TBIL, TBILI, DBIL, CBIL, LPSE, PTP    Assessment:       Active Problems:    * No active hospital problems. *  Resolved Problems:    * No resolved hospital problems.  *      Plan:        Eval Esophageal varices  Hx Colon polyps-  plan colonoscopy

## 2023-02-07 NOTE — OP NOTE
Esophagogastroduodenoscopy    DATE of PROCEDURE: 2/7/2023    INDICATION: Eval varices    POSTPROCEDURE DIAGNOSIS: esoph varices, PHG    MEDICATIONS ADMINISTERED: MAC anesthesia (see anesthesia report)    INSTRUMENT:[id number]    PROCEDURE:  After obtaining informed consent, the patient was placed in the left lateral position and sedated. The endoscope was advanced under direct vision without difficulty. The esophagus, stomach (including retroflexed views) and duodenum were evaluated. The patient was taken to the recovery area in stable condition.     FINDINGS:  ESOPHAGUS: Grade 2 varices without red color signs  STOMACH:Diffuse Portal Hypoertensive Gastritis  DUODENUM: Normal    Estimated blood loss: 0-minimal   Specimens obtained during procedure: none    PLAN: Schedule office visit to discuss TIPs

## 2023-02-07 NOTE — ANESTHESIA POSTPROCEDURE EVALUATION
Department of Anesthesiology  Postprocedure Note    Patient: Ruby Grey  MRN: [id number]  YOB: 1960  Date of evaluation: 2/7/2023      Procedure Summary     Date: 02/07/23 Room / Location: [facility] ENDO 05 / [facility] ENDOSCOPY    Anesthesia Start: 0935 Anesthesia Stop: 80    Procedures:       EGD ESOPHAGOGASTRODUODENOSCOPY (Upper GI Region)      COLONOSCOPY POLYPECTOMY REMOVAL SNARE WITH CLIP HEMOSTASIS Diagnosis:       Hepatic cirrhosis, unspecified hepatic cirrhosis type, unspecified whether ascites present (HCC)      (Hepatic cirrhosis, unspecified hepatic cirrhosis type, unspecified whether ascites present ([facility] [K74.60])    Surgeons: Leah Buitrago MD Responsible Provider: Candice Thurman MD    Anesthesia Type: TIVA ASA Status: 4          Anesthesia Type: TIVA    Bear Phase I: Bear Score: 10    Bear Phase II: Bear Score: 8      Anesthesia Post Evaluation    Patient location during evaluation: PACU  Patient participation: complete - patient participated  Level of consciousness: awake and alert  Pain score: 0  Airway patency: patent  Nausea & Vomiting: no nausea and no vomiting  Complications: no  Cardiovascular status: hemodynamically stable  Respiratory status: acceptable, nonlabored ventilation and spontaneous ventilation  Hydration status: euvolemic  Comments: /63   Pulse 62   Temp 97.7 °F (36.5 °C) (Skin)   Resp 14   Ht 5' 10\" (1.778 m)   Wt 202 lb 11.2 oz (91.9 kg)   SpO2 98%   BMI 29.08 kg/m²     Multimodal analgesia pain management approach

## 2023-02-07 NOTE — DISCHARGE INSTRUCTIONS
Gastrointestinal Esophagogastroduodenoscopy (EGD) - Upper Exam Discharge Instructions    1. Call Dr. Huma Brown at [phone] for any problems or questions. 2. Contact the doctor's office for follow up appointment as directed. 3. Medication may cause drowsiness for several hours, therefore:  Do not drive or operate machinery for remainder of the day. No alcohol today. Do not make any important or legal decisions for 24 hours. Do not sign any legal documents for 24 hours. 5. Ordinarily, you may resume regular diet and activity after exam unless otherwise specified by your physician. 6. For mild soreness in your throat you may use Cepacol throat lozenges or warm salt-water gargles as needed. Gastrointestinal Colonoscopy/Flexible Sigmoidoscopy - Lower Exam Discharge Instructions    Medication may cause drowsiness for several hours, therefore, do not drive or operate machinery for remainder of the day. No alcohol today. Ordinarily, you may resume regular diet and activity after exam unless otherwise specified by your physician. Because of air put into your colon during exam, you may experience some abdominal distension, relieved by the passage of gas, for several hours. Contact your physician if you have any of the following:  Excessive amount of bleeding - large amount when having a bowel movement.   Occasional specks of blood with bowel movement would not be unusual.  Severe abdominal pain  Fever or Chills    Any additional instructions:      1) Follow-up as directed

## 2023-02-07 NOTE — PROGRESS NOTES
1050-Discharge instructions were reviewed with patient and pts assistant from staff, Wendie Barron. An opportunity was given for questions. Patient and Cathday Barron verbalized understanding, and has no questions at this time.

## (undated) DEVICE — NEEDLE SYR 18GA L1.5IN RED PLAS HUB S STL BLNT FILL W/O

## (undated) DEVICE — GAUZE,SPONGE,4"X4",12PLY,WOVEN,NS,LF: Brand: MEDLINE

## (undated) DEVICE — KENDALL RADIOLUCENT FOAM MONITORING ELECTRODE RECTANGULAR SHAPE: Brand: KENDALL

## (undated) DEVICE — FORCEPS BX L240CM JAW DIA2.8MM L CAP W/ NDL MIC MESH TOOTH

## (undated) DEVICE — CANNULA NSL ORAL AD FOR CAPNOFLEX CO2 O2 AIRLFE

## (undated) DEVICE — CONNECTOR TBNG OD5-7MM O2 END DISP

## (undated) DEVICE — BLOCK BITE AD 60FR W/ VELC STRP ADDRESSES MOST PT AND

## (undated) DEVICE — YANKAUER,BULB TIP,W/O VENT,RIGID,STERILE: Brand: MEDLINE

## (undated) DEVICE — SNARE POLYP SM W13MMXL240CM SHTH DIA2.4MM OVL FLX DISP

## (undated) DEVICE — SYRINGE MED 3ML CLR PLAS STD N CTRL LUERLOCK TIP DISP

## (undated) DEVICE — ELECTRODE PT RET AD L9FT HI MOIST COND ADH HYDRGEL CORDED

## (undated) DEVICE — LUBE JELLY FOIL PACK 1.4 OZ: Brand: MEDLINE INDUSTRIES, INC.

## (undated) DEVICE — TRAP SPEC POLYP REM STRNR CLN DSGN MAGNIFYING WIND DISP

## (undated) DEVICE — SYRINGE MED 10ML LUERLOCK TIP W/O SFTY DISP

## (undated) DEVICE — CLIP LIG L235CM RESOL 360 BX/20

## (undated) DEVICE — SINGLE PORT MANIFOLD: Brand: NEPTUNE 2

## (undated) DEVICE — SYRINGE, LUER SLIP, STERILE, 60ML: Brand: MEDLINE

## (undated) DEVICE — CONTAINER FORMALIN PREFILLED 10% NBF 60ML

## (undated) DEVICE — AIRLIFE™ OXYGEN TUBING 7 FEET (2.1 M) CRUSH RESISTANT OXYGEN TUBING, VINYL TIPPED: Brand: AIRLIFE™